# Patient Record
Sex: FEMALE | URBAN - METROPOLITAN AREA
[De-identification: names, ages, dates, MRNs, and addresses within clinical notes are randomized per-mention and may not be internally consistent; named-entity substitution may affect disease eponyms.]

---

## 2022-03-31 DIAGNOSIS — Z11.59 ENCOUNTER FOR SCREENING FOR OTHER VIRAL DISEASES: Primary | ICD-10-CM

## 2022-04-06 RX ORDER — INSULIN GLARGINE 100 [IU]/ML
INJECTION, SOLUTION SUBCUTANEOUS
COMMUNITY
Start: 2022-03-26

## 2022-04-06 RX ORDER — METOPROLOL SUCCINATE 50 MG/1
50 TABLET, EXTENDED RELEASE ORAL
COMMUNITY
Start: 2021-11-29

## 2022-04-06 RX ORDER — BLOOD-GLUCOSE METER
KIT MISCELLANEOUS
COMMUNITY
Start: 2021-11-29

## 2022-04-06 RX ORDER — LISINOPRIL 30 MG/1
30 TABLET ORAL
COMMUNITY
Start: 2021-10-11

## 2022-04-06 RX ORDER — ATORVASTATIN CALCIUM 80 MG/1
1 TABLET, FILM COATED ORAL AT BEDTIME
COMMUNITY
Start: 2022-03-31

## 2022-04-06 RX ORDER — BENZALKONIUM CHLORIDE 1.3 MG/ML
CLOTH TOPICAL
COMMUNITY
Start: 2022-03-11

## 2022-04-06 RX ORDER — AMITRIPTYLINE HYDROCHLORIDE 10 MG/1
10 TABLET ORAL DAILY
COMMUNITY
Start: 2021-11-29

## 2022-04-06 RX ORDER — FUROSEMIDE 20 MG
20 TABLET ORAL
COMMUNITY
Start: 2022-03-02

## 2022-04-06 RX ORDER — AMLODIPINE BESYLATE 10 MG/1
10 TABLET ORAL
COMMUNITY
Start: 2021-11-29

## 2022-04-11 ENCOUNTER — LAB (OUTPATIENT)
Dept: LAB | Facility: CLINIC | Age: 82
End: 2022-04-11
Attending: COLON & RECTAL SURGERY
Payer: MEDICARE

## 2022-04-11 DIAGNOSIS — Z11.59 ENCOUNTER FOR SCREENING FOR OTHER VIRAL DISEASES: ICD-10-CM

## 2022-04-11 PROCEDURE — U0005 INFEC AGEN DETEC AMPLI PROBE: HCPCS

## 2022-04-11 PROCEDURE — U0003 INFECTIOUS AGENT DETECTION BY NUCLEIC ACID (DNA OR RNA); SEVERE ACUTE RESPIRATORY SYNDROME CORONAVIRUS 2 (SARS-COV-2) (CORONAVIRUS DISEASE [COVID-19]), AMPLIFIED PROBE TECHNIQUE, MAKING USE OF HIGH THROUGHPUT TECHNOLOGIES AS DESCRIBED BY CMS-2020-01-R: HCPCS

## 2022-04-12 ENCOUNTER — ANESTHESIA EVENT (OUTPATIENT)
Dept: SURGERY | Facility: AMBULATORY SURGERY CENTER | Age: 82
End: 2022-04-12
Payer: MEDICARE

## 2022-04-12 LAB — SARS-COV-2 RNA RESP QL NAA+PROBE: NEGATIVE

## 2022-04-13 ENCOUNTER — ANESTHESIA (OUTPATIENT)
Dept: SURGERY | Facility: AMBULATORY SURGERY CENTER | Age: 82
End: 2022-04-13
Payer: MEDICARE

## 2022-04-13 ENCOUNTER — HOSPITAL ENCOUNTER (OUTPATIENT)
Facility: AMBULATORY SURGERY CENTER | Age: 82
Discharge: HOME | End: 2022-04-13
Attending: COLON & RECTAL SURGERY
Payer: MEDICARE

## 2022-04-13 VITALS
HEIGHT: 63 IN | HEART RATE: 77 BPM | RESPIRATION RATE: 17 BRPM | BODY MASS INDEX: 39.69 KG/M2 | DIASTOLIC BLOOD PRESSURE: 63 MMHG | TEMPERATURE: 98.1 F | OXYGEN SATURATION: 97 % | WEIGHT: 223.99 LBS | SYSTOLIC BLOOD PRESSURE: 141 MMHG

## 2022-04-13 DIAGNOSIS — Z12.11 COLON CANCER SCREENING: ICD-10-CM

## 2022-04-13 LAB
GLUCOSE BY METER: 163
GLUCOSE SERPL-MCNC: 163 MG/DL (ref 70–99)

## 2022-04-13 RX ORDER — FENTANYL CITRATE 0.05 MG/ML
50 INJECTION, SOLUTION INTRAMUSCULAR; INTRAVENOUS EVERY 5 MIN PRN
Status: DISCONTINUED | OUTPATIENT
Start: 2022-04-13 | End: 2022-04-14 | Stop reason: HOSPADM

## 2022-04-13 RX ORDER — SODIUM CHLORIDE, SODIUM LACTATE, POTASSIUM CHLORIDE, CALCIUM CHLORIDE 600; 310; 30; 20 MG/100ML; MG/100ML; MG/100ML; MG/100ML
INJECTION, SOLUTION INTRAVENOUS CONTINUOUS
Status: DISCONTINUED | OUTPATIENT
Start: 2022-04-13 | End: 2022-04-14 | Stop reason: HOSPADM

## 2022-04-13 RX ORDER — LIDOCAINE HYDROCHLORIDE 20 MG/ML
INJECTION, SOLUTION INFILTRATION; PERINEURAL PRN
Status: DISCONTINUED | OUTPATIENT
Start: 2022-04-13 | End: 2022-04-13

## 2022-04-13 RX ORDER — OXYCODONE HYDROCHLORIDE 5 MG/1
5 TABLET ORAL EVERY 4 HOURS PRN
Status: DISCONTINUED | OUTPATIENT
Start: 2022-04-13 | End: 2022-04-14 | Stop reason: HOSPADM

## 2022-04-13 RX ORDER — ONDANSETRON 2 MG/ML
INJECTION INTRAMUSCULAR; INTRAVENOUS PRN
Status: DISCONTINUED | OUTPATIENT
Start: 2022-04-13 | End: 2022-04-13

## 2022-04-13 RX ORDER — ONDANSETRON 2 MG/ML
4 INJECTION INTRAMUSCULAR; INTRAVENOUS EVERY 30 MIN PRN
Status: DISCONTINUED | OUTPATIENT
Start: 2022-04-13 | End: 2022-04-14 | Stop reason: HOSPADM

## 2022-04-13 RX ORDER — HYDROMORPHONE HCL IN WATER/PF 6 MG/30 ML
0.4 PATIENT CONTROLLED ANALGESIA SYRINGE INTRAVENOUS EVERY 5 MIN PRN
Status: DISCONTINUED | OUTPATIENT
Start: 2022-04-13 | End: 2022-04-14 | Stop reason: HOSPADM

## 2022-04-13 RX ORDER — PROPOFOL 10 MG/ML
INJECTION, EMULSION INTRAVENOUS CONTINUOUS PRN
Status: DISCONTINUED | OUTPATIENT
Start: 2022-04-13 | End: 2022-04-13

## 2022-04-13 RX ORDER — FENTANYL CITRATE 0.05 MG/ML
50 INJECTION, SOLUTION INTRAMUSCULAR; INTRAVENOUS
Status: DISCONTINUED | OUTPATIENT
Start: 2022-04-13 | End: 2022-04-14 | Stop reason: HOSPADM

## 2022-04-13 RX ORDER — GLYCOPYRROLATE 0.2 MG/ML
INJECTION, SOLUTION INTRAMUSCULAR; INTRAVENOUS PRN
Status: DISCONTINUED | OUTPATIENT
Start: 2022-04-13 | End: 2022-04-13

## 2022-04-13 RX ORDER — MEPERIDINE HYDROCHLORIDE 25 MG/ML
12.5 INJECTION INTRAMUSCULAR; INTRAVENOUS; SUBCUTANEOUS
Status: DISCONTINUED | OUTPATIENT
Start: 2022-04-13 | End: 2022-04-14 | Stop reason: HOSPADM

## 2022-04-13 RX ORDER — LIDOCAINE 40 MG/G
CREAM TOPICAL
Status: DISCONTINUED | OUTPATIENT
Start: 2022-04-13 | End: 2022-04-14 | Stop reason: HOSPADM

## 2022-04-13 RX ORDER — PROPOFOL 10 MG/ML
INJECTION, EMULSION INTRAVENOUS PRN
Status: DISCONTINUED | OUTPATIENT
Start: 2022-04-13 | End: 2022-04-13

## 2022-04-13 RX ORDER — ONDANSETRON 4 MG/1
4 TABLET, ORALLY DISINTEGRATING ORAL EVERY 30 MIN PRN
Status: DISCONTINUED | OUTPATIENT
Start: 2022-04-13 | End: 2022-04-14 | Stop reason: HOSPADM

## 2022-04-13 RX ADMIN — LIDOCAINE HYDROCHLORIDE 60 MG: 20 INJECTION, SOLUTION INFILTRATION; PERINEURAL at 08:55

## 2022-04-13 RX ADMIN — SODIUM CHLORIDE, SODIUM LACTATE, POTASSIUM CHLORIDE, CALCIUM CHLORIDE: 600; 310; 30; 20 INJECTION, SOLUTION INTRAVENOUS at 08:21

## 2022-04-13 RX ADMIN — PROPOFOL 200 MCG/KG/MIN: 10 INJECTION, EMULSION INTRAVENOUS at 08:55

## 2022-04-13 RX ADMIN — ONDANSETRON 4 MG: 2 INJECTION INTRAMUSCULAR; INTRAVENOUS at 08:58

## 2022-04-13 RX ADMIN — PROPOFOL 30 MG: 10 INJECTION, EMULSION INTRAVENOUS at 08:55

## 2022-04-13 RX ADMIN — GLYCOPYRROLATE 0.2 MG: 0.2 INJECTION, SOLUTION INTRAMUSCULAR; INTRAVENOUS at 08:55

## 2022-04-13 NOTE — ANESTHESIA CARE TRANSFER NOTE
Patient: Radha Diaz    Procedure: Procedure(s):  COLONOSCOPY       Diagnosis: Colon cancer screening [Z12.11]  Diagnosis Additional Information: No value filed.    Anesthesia Type:   MAC     Note:    Oropharynx: oropharynx clear of all foreign objects and spontaneously breathing  Level of Consciousness: awake  Oxygen Supplementation: room air    Independent Airway: airway patency satisfactory and stable  Dentition: dentition unchanged  Vital Signs Stable: post-procedure vital signs reviewed and stable  Report to RN Given: handoff report given  Patient transferred to: Phase II    Handoff Report: Identifed the Patient, Identified the Reponsible Provider, Reviewed the pertinent medical history, Discussed the surgical course, Reviewed Intra-OP anesthesia mangement and issues during anesthesia, Set expectations for post-procedure period and Allowed opportunity for questions and acknowledgement of understanding      Vitals:  Vitals Value Taken Time   /55 04/13/22 0930   Temp     Pulse 78 04/13/22 0934   Resp 16    SpO2 96 % 04/13/22 0934   Vitals shown include unvalidated device data.    Electronically Signed By: RAMIREZ Gan CRNA  April 13, 2022  9:37 AM

## 2022-04-13 NOTE — DISCHARGE INSTRUCTIONS
If you have any questions or concerns regarding your procedure, please contact Dr. Anna, his office number is [phone].     Discharge Instructions:    Take you medications as directed and follow up with you primary provider as scheduled.   You should expect to pass air from your rectum after the procedure.   Follow these care guidelines during your recovery for the next 24 hours.   If you have any questions or concerns please contact the provider that performed your procedure.     You were given medicine for sedation:  You have been given medicines during your procedure that might make you sleepy and weak. To prevent problems:    *Rest for the rest of the day after you are home. You should be back to your normal activity tomorrow.  *For the next 24 hours:   -Do not drink alcoholic beverages.   -Do not make any important decisions or sign any legal forms.   -Do not work around machinery or power equipment.     The medicines used for sedation may make you feel nauseated.   *Start with clear liquids, such as tea, jell-o, broth and ginger ale. As you feel better you may add soft foods such as pudding and ice cream.   *When you no longer feel nauseated, you may try your normal diet.     You should be back to eating your normal after 24 hours.     Call if you have any of these problems:  *Fever of 101 degree F or 38 degrees C  *Bleeding from the rectum  *Black stool or blood in your bowel movements  *Nausea with vomiting that does not ease after a few hours.  *Abdominal pain or bloating  *Fainting

## 2022-04-13 NOTE — OP NOTE
[facility] Brief Operative Note    Pre-operative diagnosis: Colon cancer screening [Z12.11],  Hx of colon polyps   Post-operative diagnosis same   Procedure: Procedure(s):  COLONOSCOPY with 3x sigmoid polypectomies   Surgeon: Rodolfo Anna II, MD   Assistants(s): none   Estimated blood loss: none    Specimens: 3x sigmoid polyps   Findings: 3x sigmoid polyps

## 2022-04-13 NOTE — ANESTHESIA PREPROCEDURE EVALUATION
Anesthesia Pre-Procedure Evaluation    Patient: Radha Diaz   MRN: [id number] : 1940        Procedure : Procedure(s):  COLONOSCOPY          Past Medical History:   Diagnosis Date     Diabetes (H)      Gastroesophageal reflux disease      Heart murmur      Hypertension      Pulmonary embolism (H)      Renal disease      Sleep apnea       History reviewed. No pertinent surgical history.   Allergies   Allergen Reactions     Meperidine GI Disturbance      Social History     Tobacco Use     Smoking status: Former Smoker     Quit date: 1975     Years since quittin.3     Smokeless tobacco: Never Used   Substance Use Topics     Alcohol use: Not on file      Wt Readings from Last 1 Encounters:   22 101.6 kg (223 lb 15.8 oz)        Anesthesia Evaluation   Pt has had prior anesthetic.     History of anesthetic complications  - PONV.      ROS/MED HX  ENT/Pulmonary: Comment: Bilateral pulmonary embolism     Recently seen in the ED for LEYVA with elevated BP.  Pt says this has resolved and has been out walking 2 miles yesterday without SOB.    (+) sleep apnea, uses CPAP,     Neurologic:  - neg neurologic ROS     Cardiovascular: Comment: 21 NM stress   FINAL CONCLUSIONS    Normal pharmacologic stress perfusion imaging study.    1. Probably normal perfusion images despite excessive bowel or liver isotope uptake which may   result in a decreased sensitivity to        exclude coronary artery disease.    2. Normal left ventricular size and systolic function with a calculated LVEF of 72%.       (+) Dyslipidemia hypertension-----    METS/Exercise Tolerance: >4 METS    Hematologic:  - neg hematologic  ROS     Musculoskeletal: Comment: Glaucoma      GI/Hepatic:     (+) GERD,     Renal/Genitourinary:     (+) renal disease, type: CRI,     Endo: Comment: Metabolic syndrome  Secondary hyperparathyroidism of renal origin     (+) type II DM, Using insulin, Obesity (BMI 40),     Psychiatric/Substance Use:        Infectious Disease:       Malignancy: Comment: S/p bilateral mastectomies  (+) Malignancy, History of Breast.Breast CA status post Surgery.        Other:            Physical Exam    Airway        Mallampati: II   TM distance: > 3 FB   Neck ROM: full   Mouth opening: > 3 cm    Respiratory Devices and Support         Dental  no notable dental history         Cardiovascular          Rhythm and rate: regular and normal   (+) murmur       Pulmonary   pulmonary exam normal                OUTSIDE LABS:  CBC: No results found for: WBC, HGB, HCT, PLT  BMP: No results found for: NA, POTASSIUM, CHLORIDE, CO2, BUN, CR, GLC  COAGS: No results found for: PTT, INR, FIBR  POC: No results found for: BGM, HCG, HCGS  HEPATIC: No results found for: ALBUMIN, PROTTOTAL, ALT, AST, GGT, ALKPHOS, BILITOTAL, BILIDIRECT, LUIS  OTHER: No results found for: PH, LACT, A1C, JONATHAN, PHOS, MAG, LIPASE, AMYLASE, TSH, T4, T3, CRP, SED    Anesthesia Plan    ASA Status:  3   NPO Status:  NPO Appropriate    Anesthesia Type: MAC.              Consents    Anesthesia Plan(s) and associated risks, benefits, and realistic alternatives discussed. Questions answered and patient/representative(s) expressed understanding.     - Discussed: Risks, Benefits and Alternatives for BOTH SEDATION and the PROCEDURE were discussed     - Discussed with:  Patient      - Extended Intubation/Ventilatory Support Discussed: No.      - Patient is DNR/DNI Status: No         Postoperative Care       PONV prophylaxis: Ondansetron (or other 5HT-3)     Comments:    Other Comments: Propofol sedation            Jose Simms MD

## 2022-04-13 NOTE — ANESTHESIA POSTPROCEDURE EVALUATION
Patient: Radha Diaz    Procedure: Procedure(s):  COLONOSCOPY       Anesthesia Type:  MAC    Note:  Disposition: Outpatient   Postop Pain Control: Uneventful            Sign Out: Well controlled pain   PONV: No   Neuro/Psych: Uneventful            Sign Out: Acceptable/Baseline neuro status   Airway/Respiratory: Uneventful            Sign Out: Acceptable/Baseline resp. status   CV/Hemodynamics: Uneventful            Sign Out: Acceptable CV status; No obvious hypovolemia; No obvious fluid overload   Other NRE: NONE   DID A NON-ROUTINE EVENT OCCUR? No           Last vitals:  Vitals Value Taken Time   /67 04/13/22 1000   Temp     Pulse 74 04/13/22 1009   Resp 17 04/13/22 0930   SpO2 97 % 04/13/22 1009   Vitals shown include unvalidated device data.    Electronically Signed By: Jose Simms MD  April 13, 2022  10:19 AM

## 2022-04-13 NOTE — H&P
[facility] History and Physical    Radha Diaz MRN# [id number]   Age: 81 year old YOB: 1940     Date of Admission:  4/13/2022    Home clinic: n/a  Primary care provider: No primary care provider on file.          Assessment and Plan:   Assessment:   colonoscopy      Plan:   colonoscopy            Chief Complaint:   /hx of polyps    History is obtained from the patient         History of Present Illness:   This patient is a 81 year old  female without a significant past medical history of who presents with the following condition requiring a hospital admission:    colonoscopy         Past Medical History:   This patient has no significant past medical history         Past Surgical History:   This patient has no significant past surgical history         Social History:   This patient has no significant social history         Family History:   This patient has no significant family history         Immunizations:   Immunizations are up to date         Allergies:   All allergies reviewed and addressed         Medications:     Current Outpatient Medications   Medication Sig     amitriptyline (ELAVIL) 10 MG tablet Take 10 mg by mouth daily     amLODIPine (NORVASC) 10 MG tablet Take 10 mg by mouth     atorvastatin (LIPITOR) 80 MG tablet Take 1 tablet by mouth At Bedtime     blood glucose (FREESTYLE LITE) test strip Test twice daily. Dispense item covered by pt ins.     cholecalciferol (VITAMIN D3) 25 mcg (1000 units) capsule Take 1,000 Units by mouth     furosemide (LASIX) 20 MG tablet Take 20 mg by mouth     insulin glargine (LANTUS SOLOSTAR) 100 UNIT/ML pen      lisinopril (ZESTRIL) 30 MG tablet Take 30 mg by mouth     metFORMIN (GLUCOPHAGE) 500 MG tablet Take 500 mg by mouth     metoprolol succinate ER (TOPROL-XL) 50 MG 24 hr tablet Take 50 mg by mouth     Misc. Devices (ILLUSIONS AA BREAST PROSTHESIS) MISC Dispense 12 mastectomy bras and 3 breast prosthesis     polyethylene  glycol (GOLYTELY) 236 g suspension See Admin Instructions     Respiratory Therapy Supplies (CARETOUCH 2 CPAP HOSE ) MISC Replacement CPAP machine for home use at pressure: 10-20 cmw , Heated humidifier x 1 q 5 yr, Humidifier chamber x 1 q 6 mo, Full face mask x1 q 3mos,  with cushion x 1 q mo, Heated tubing x 1 q 3 mo, Headgear x 1 q 6 mo, Filters: Disposable x 2 q mo non-disposable filters x1 q 6mo, Length of Need: 99 months, Frequency of use: Daily     Current Facility-Administered Medications   Medication     lactated ringers infusion     lidocaine (LMX4) kit     lidocaine 1 % 0.1-1 mL     PRE OP antibiotics NOT needed for this surgical procedure     sodium chloride (PF) 0.9% PF flush 3 mL     sodium chloride (PF) 0.9% PF flush 3 mL             Review of Systems:   CONSTITUTIONAL: NEGATIVE for fever, chills, change in weight  ENT/MOUTH: NEGATIVE for ear, mouth and throat problems  RESP: NEGATIVE for significant cough or SOB  CV: NEGATIVE for chest pain, palpitations or peripheral edema         Physical Exam:   Vitals were reviewed  Heart and lungs normal         Data:   All laboratory data reviewed  All cardiac studies reviewed by me.  All imaging studies reviewed by me.     Attestation:  I have reviewed today's vital signs, notes, medications, labs and imaging.    Rodolfo Anna II, MD

## 2022-04-13 NOTE — OP NOTE
Procedure Date: 2022    SURGEON:  Rodolfo Anna MD.    PREOPERATIVE DIAGNOSES:  History of colonic polyps, colonoscopy with a 3 sigmoid polypectomies.    POSTOPERATIVE DIAGNOSES:  History of colonic polyps, colonoscopy with a 3 sigmoid polypectomies.      ANESTHESIA:  MAC.      SURGEON:   Rodolfo Anna MD.    COMPLICATIONS:  None.    SPECIMENS:  Three sigmoid polyps.    BLEEDING:  None.    HISTORY:  This is an 81-year-old female with a history of colonic polyps, last colonoscopy was approximately 5 years ago.  She presents for followup colonoscopy surveillance.    DESCRIPTION OF PROCEDURE:  After informed consent was obtained, the patient was brought to the day surgery suite and placed in the left lateral decubitus position.  Appropriate briefing and timeout were performed.  Digital examination was performed, which was normal.  The scope was put through the anal canal into the rectum and guided under direct vision to the cecum.  The cecum was identified by anatomical landmarks and ballottement.  The prep was good.  The scope was slowly withdrawn.  The entire mucosal surface was visualized.  The patient has scattered sigmoid diverticulosis without fixation or scarring.  She had several 2-3 mm polyps that were removed with cold biopsy forceps and retrieved.  The procedure was concluded.  Withdrawal time was 12 minutes and 45 seconds.  The patient tolerated the procedure well.  No complications.  Copies as above.  She should get followup colonoscopy in 5 years, pending her medical condition at that point in time.    Rodolfo Anna II, MD        D: 2022   T: 2022   MT: sd    Name:     TERRA QUEVEDO  MRN:      [id number]        Account:        [id number]   :      1940           Procedure Date: 2022     Document: [id number]